# Patient Record
Sex: FEMALE | Race: ASIAN | NOT HISPANIC OR LATINO | URBAN - METROPOLITAN AREA
[De-identification: names, ages, dates, MRNs, and addresses within clinical notes are randomized per-mention and may not be internally consistent; named-entity substitution may affect disease eponyms.]

---

## 2017-10-05 ENCOUNTER — INPATIENT (INPATIENT)
Facility: HOSPITAL | Age: 32
LOS: 5 days | Discharge: HOME | End: 2017-10-11
Attending: PSYCHIATRY & NEUROLOGY | Admitting: PSYCHIATRY & NEUROLOGY
Payer: COMMERCIAL

## 2017-10-05 VITALS
OXYGEN SATURATION: 100 % | TEMPERATURE: 98 F | SYSTOLIC BLOOD PRESSURE: 125 MMHG | RESPIRATION RATE: 18 BRPM | HEART RATE: 90 BPM | DIASTOLIC BLOOD PRESSURE: 82 MMHG

## 2017-10-05 DIAGNOSIS — F32.2 MAJOR DEPRESSIVE DISORDER, SINGLE EPISODE, SEVERE WITHOUT PSYCHOTIC FEATURES: ICD-10-CM

## 2017-10-05 LAB
ALBUMIN SERPL ELPH-MCNC: 4.5 G/DL — SIGNIFICANT CHANGE UP (ref 3.3–5)
ALP SERPL-CCNC: 82 U/L — SIGNIFICANT CHANGE UP (ref 40–120)
ALT FLD-CCNC: 21 U/L — SIGNIFICANT CHANGE UP (ref 4–33)
AMPHET UR-MCNC: NEGATIVE — SIGNIFICANT CHANGE UP
APAP SERPL-MCNC: < 15 UG/ML — LOW (ref 15–25)
APPEARANCE UR: SIGNIFICANT CHANGE UP
AST SERPL-CCNC: 21 U/L — SIGNIFICANT CHANGE UP (ref 4–32)
BARBITURATES MEASUREMENT: NEGATIVE — SIGNIFICANT CHANGE UP
BARBITURATES UR SCN-MCNC: NEGATIVE — SIGNIFICANT CHANGE UP
BASOPHILS # BLD AUTO: 0.06 K/UL — SIGNIFICANT CHANGE UP (ref 0–0.2)
BASOPHILS NFR BLD AUTO: 0.7 % — SIGNIFICANT CHANGE UP (ref 0–2)
BENZODIAZ SERPL-MCNC: NEGATIVE — SIGNIFICANT CHANGE UP
BENZODIAZ UR-MCNC: NEGATIVE — SIGNIFICANT CHANGE UP
BILIRUB SERPL-MCNC: 0.4 MG/DL — SIGNIFICANT CHANGE UP (ref 0.2–1.2)
BILIRUB UR-MCNC: NEGATIVE — SIGNIFICANT CHANGE UP
BLOOD UR QL VISUAL: HIGH
BUN SERPL-MCNC: 14 MG/DL — SIGNIFICANT CHANGE UP (ref 7–23)
CALCIUM SERPL-MCNC: 9.3 MG/DL — SIGNIFICANT CHANGE UP (ref 8.4–10.5)
CANNABINOIDS UR-MCNC: NEGATIVE — SIGNIFICANT CHANGE UP
CHLORIDE SERPL-SCNC: 103 MMOL/L — SIGNIFICANT CHANGE UP (ref 98–107)
CO2 SERPL-SCNC: 25 MMOL/L — SIGNIFICANT CHANGE UP (ref 22–31)
COCAINE METAB.OTHER UR-MCNC: NEGATIVE — SIGNIFICANT CHANGE UP
COLOR SPEC: YELLOW — SIGNIFICANT CHANGE UP
CREAT SERPL-MCNC: 0.65 MG/DL — SIGNIFICANT CHANGE UP (ref 0.5–1.3)
EOSINOPHIL # BLD AUTO: 0.12 K/UL — SIGNIFICANT CHANGE UP (ref 0–0.5)
EOSINOPHIL NFR BLD AUTO: 1.3 % — SIGNIFICANT CHANGE UP (ref 0–6)
ETHANOL BLD-MCNC: < 10 MG/DL — SIGNIFICANT CHANGE UP
GLUCOSE SERPL-MCNC: 87 MG/DL — SIGNIFICANT CHANGE UP (ref 70–99)
GLUCOSE UR-MCNC: NEGATIVE — SIGNIFICANT CHANGE UP
HCG SERPL-ACNC: < 5 MIU/ML — SIGNIFICANT CHANGE UP
HCT VFR BLD CALC: 41.3 % — SIGNIFICANT CHANGE UP (ref 34.5–45)
HGB BLD-MCNC: 13.2 G/DL — SIGNIFICANT CHANGE UP (ref 11.5–15.5)
IMM GRANULOCYTES # BLD AUTO: 0.05 # — SIGNIFICANT CHANGE UP
IMM GRANULOCYTES NFR BLD AUTO: 0.6 % — SIGNIFICANT CHANGE UP (ref 0–1.5)
KETONES UR-MCNC: NEGATIVE — SIGNIFICANT CHANGE UP
LEUKOCYTE ESTERASE UR-ACNC: HIGH
LYMPHOCYTES # BLD AUTO: 2.36 K/UL — SIGNIFICANT CHANGE UP (ref 1–3.3)
LYMPHOCYTES # BLD AUTO: 26 % — SIGNIFICANT CHANGE UP (ref 13–44)
MCHC RBC-ENTMCNC: 27.3 PG — SIGNIFICANT CHANGE UP (ref 27–34)
MCHC RBC-ENTMCNC: 32 % — SIGNIFICANT CHANGE UP (ref 32–36)
MCV RBC AUTO: 85.5 FL — SIGNIFICANT CHANGE UP (ref 80–100)
METHADONE UR-MCNC: NEGATIVE — SIGNIFICANT CHANGE UP
MONOCYTES # BLD AUTO: 0.51 K/UL — SIGNIFICANT CHANGE UP (ref 0–0.9)
MONOCYTES NFR BLD AUTO: 5.6 % — SIGNIFICANT CHANGE UP (ref 2–14)
MUCOUS THREADS # UR AUTO: SIGNIFICANT CHANGE UP
NEUTROPHILS # BLD AUTO: 5.96 K/UL — SIGNIFICANT CHANGE UP (ref 1.8–7.4)
NEUTROPHILS NFR BLD AUTO: 65.8 % — SIGNIFICANT CHANGE UP (ref 43–77)
NITRITE UR-MCNC: NEGATIVE — SIGNIFICANT CHANGE UP
NRBC # FLD: 0 — SIGNIFICANT CHANGE UP
OPIATES UR-MCNC: NEGATIVE — SIGNIFICANT CHANGE UP
OXYCODONE UR-MCNC: NEGATIVE — SIGNIFICANT CHANGE UP
PCP UR-MCNC: NEGATIVE — SIGNIFICANT CHANGE UP
PH UR: 6.5 — SIGNIFICANT CHANGE UP (ref 4.6–8)
PLATELET # BLD AUTO: 270 K/UL — SIGNIFICANT CHANGE UP (ref 150–400)
PMV BLD: 10 FL — SIGNIFICANT CHANGE UP (ref 7–13)
POTASSIUM SERPL-MCNC: 4.1 MMOL/L — SIGNIFICANT CHANGE UP (ref 3.5–5.3)
POTASSIUM SERPL-SCNC: 4.1 MMOL/L — SIGNIFICANT CHANGE UP (ref 3.5–5.3)
PROT SERPL-MCNC: 7.3 G/DL — SIGNIFICANT CHANGE UP (ref 6–8.3)
PROT UR-MCNC: 100 — HIGH
RBC # BLD: 4.83 M/UL — SIGNIFICANT CHANGE UP (ref 3.8–5.2)
RBC # FLD: 13.2 % — SIGNIFICANT CHANGE UP (ref 10.3–14.5)
RBC CASTS # UR COMP ASSIST: SIGNIFICANT CHANGE UP (ref 0–?)
SALICYLATES SERPL-MCNC: < 5 MG/DL — LOW (ref 15–30)
SODIUM SERPL-SCNC: 142 MMOL/L — SIGNIFICANT CHANGE UP (ref 135–145)
SP GR SPEC: 1.02 — SIGNIFICANT CHANGE UP (ref 1–1.03)
SQUAMOUS # UR AUTO: SIGNIFICANT CHANGE UP
TSH SERPL-MCNC: 0.9 UIU/ML — SIGNIFICANT CHANGE UP (ref 0.27–4.2)
UROBILINOGEN FLD QL: NORMAL E.U. — SIGNIFICANT CHANGE UP (ref 0.1–0.2)
WBC # BLD: 9.06 K/UL — SIGNIFICANT CHANGE UP (ref 3.8–10.5)
WBC # FLD AUTO: 9.06 K/UL — SIGNIFICANT CHANGE UP (ref 3.8–10.5)
WBC UR QL: >50 — HIGH (ref 0–?)

## 2017-10-05 PROCEDURE — 99285 EMERGENCY DEPT VISIT HI MDM: CPT | Mod: GC

## 2017-10-05 RX ORDER — CEPHALEXIN 500 MG
500 CAPSULE ORAL ONCE
Qty: 0 | Refills: 0 | Status: COMPLETED | OUTPATIENT
Start: 2017-10-05 | End: 2017-10-05

## 2017-10-05 RX ORDER — NITROFURANTOIN MACROCRYSTAL 50 MG
100 CAPSULE ORAL
Qty: 0 | Refills: 0 | Status: COMPLETED | OUTPATIENT
Start: 2017-10-06 | End: 2017-10-10

## 2017-10-05 RX ORDER — ESCITALOPRAM OXALATE 10 MG/1
5 TABLET, FILM COATED ORAL DAILY
Qty: 0 | Refills: 0 | Status: DISCONTINUED | OUTPATIENT
Start: 2017-10-05 | End: 2017-10-06

## 2017-10-05 RX ORDER — DIPHENHYDRAMINE HCL 50 MG
50 CAPSULE ORAL AT BEDTIME
Qty: 0 | Refills: 0 | Status: DISCONTINUED | OUTPATIENT
Start: 2017-10-05 | End: 2017-10-11

## 2017-10-05 RX ADMIN — Medication 500 MILLIGRAM(S): at 20:17
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
## 2017-10-05 NOTE — ED BEHAVIORAL HEALTH ASSESSMENT NOTE - PSYCHIATRIC ISSUES AND PLAN (INCLUDE STANDING AND PRN MEDICATION)
Lexapro 5 mg daily for MDD, Benadryl 50 mg QHS prn insomnia; Ativan 2 mg IM Q6H PRN severe agitation

## 2017-10-05 NOTE — ED PROVIDER NOTE - PROGRESS NOTE DETAILS
Patient reports  foul odor with urine Spoke with Dr Palm from toxicology reviewed chart patient cleared fro [facility] no other medical concerns or complaints recommendations Spoke with Dr Palm from toxicology reviewed chart patient cleared sally CHOWDHURY no other medical concerns or complaints recommendations

## 2017-10-05 NOTE — ED PROVIDER NOTE - MEDICAL DECISION MAKING DETAILS
uti recommendation Keflex 500 mg BID x 7 days This is a 32 year old Female No PMHX BIBA from home for psych eval r/t agitation. patient reports she found out two months ago her  was cheating on her. Since then she has "been going crazy Patient too 15 tabs Unisom cleared by toxicology uti recommendation Keflex 500 mg BID x 7 days  Medical evaluation performed. There is no clinical evidence of intoxication or any acute medical problem requiring immediate intervention. Final disposition will be determined by psychiatrist.
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
## 2017-10-05 NOTE — ED ADULT TRIAGE NOTE - CHIEF COMPLAINT QUOTE
states " I am  having disputes with my  and feel helpless." patient took Unisom  15 tables yesterday morning  an induced vomiting  and unsure of vomiting pills. patient is tearful in triage.

## 2017-10-05 NOTE — ED BEHAVIORAL HEALTH ASSESSMENT NOTE - RISK ASSESSMENT
Patient is currently at high risk for suicide. Risk factors include current ambivalence about life, recent suicide attempt, further plans today, impulsivity, and major mood episode. Protective factors include her children, no access to guns, no history of psychiatric hospitalizations.

## 2017-10-05 NOTE — ED BEHAVIORAL HEALTH ASSESSMENT NOTE - CASE SUMMARY
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 Patient is ambivalent about her suicide attempt and impulsively threw an object through a window today. This along with her current mood episode of MDD place her at increased risk, requiring acute inpatient hospitalization for safety.  Patient represents an acute risk to self and admitted to [facility] inpatient psych unit.  No need for constant observation in a locked, supervised setting.  Transportation to unit arranged accompanied by security.
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
## 2017-10-05 NOTE — ED BEHAVIORAL HEALTH ASSESSMENT NOTE - DESCRIPTION
calm and cooperative none see HPI calm and cooperative  Vital Signs Last 24 Hrs  T(C): 36.9 (05 Oct 2017 14:44), Max: 36.9 (05 Oct 2017 14:44)  T(F): 98.4 (05 Oct 2017 14:44), Max: 98.4 (05 Oct 2017 14:44)  HR: 90 (05 Oct 2017 14:44) (90 - 90)  BP: 125/82 (05 Oct 2017 14:44) (125/82 - 125/82)  BP(mean): --  RR: 18 (05 Oct 2017 14:44) (18 - 18)  SpO2: 100% (05 Oct 2017 14:44) (100% - 100%)
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
## 2017-10-06 DIAGNOSIS — R69 ILLNESS, UNSPECIFIED: ICD-10-CM

## 2017-10-06 PROCEDURE — 90832 PSYTX W PT 30 MINUTES: CPT

## 2017-10-06 PROCEDURE — 99222 1ST HOSP IP/OBS MODERATE 55: CPT

## 2017-10-06 RX ORDER — SERTRALINE 25 MG/1
25 TABLET, FILM COATED ORAL DAILY
Qty: 0 | Refills: 0 | Status: DISCONTINUED | OUTPATIENT
Start: 2017-10-07 | End: 2017-10-09

## 2017-10-06 RX ADMIN — ESCITALOPRAM OXALATE 5 MILLIGRAM(S): 10 TABLET, FILM COATED ORAL at 08:15

## 2017-10-06 RX ADMIN — Medication 50 MILLIGRAM(S): at 20:58

## 2017-10-06 RX ADMIN — Medication 100 MILLIGRAM(S): at 15:46

## 2017-10-06 RX ADMIN — Medication 100 MILLIGRAM(S): at 08:15

## 2017-10-07 RX ADMIN — Medication 100 MILLIGRAM(S): at 08:46

## 2017-10-07 RX ADMIN — Medication 50 MILLIGRAM(S): at 22:15

## 2017-10-07 RX ADMIN — Medication 100 MILLIGRAM(S): at 15:59

## 2017-10-07 RX ADMIN — SERTRALINE 25 MILLIGRAM(S): 25 TABLET, FILM COATED ORAL at 08:46

## 2017-10-08 RX ADMIN — SERTRALINE 25 MILLIGRAM(S): 25 TABLET, FILM COATED ORAL at 08:07

## 2017-10-08 RX ADMIN — Medication 100 MILLIGRAM(S): at 08:07

## 2017-10-08 RX ADMIN — Medication 100 MILLIGRAM(S): at 16:07

## 2017-10-09 PROCEDURE — 99232 SBSQ HOSP IP/OBS MODERATE 35: CPT

## 2017-10-09 RX ORDER — SERTRALINE 25 MG/1
50 TABLET, FILM COATED ORAL DAILY
Qty: 0 | Refills: 0 | Status: DISCONTINUED | OUTPATIENT
Start: 2017-10-10 | End: 2017-10-11

## 2017-10-09 RX ADMIN — Medication 100 MILLIGRAM(S): at 18:22

## 2017-10-09 RX ADMIN — SERTRALINE 25 MILLIGRAM(S): 25 TABLET, FILM COATED ORAL at 10:17

## 2017-10-09 RX ADMIN — Medication 50 MILLIGRAM(S): at 21:44

## 2017-10-09 RX ADMIN — Medication 100 MILLIGRAM(S): at 10:17

## 2017-10-10 PROCEDURE — 90853 GROUP PSYCHOTHERAPY: CPT

## 2017-10-10 PROCEDURE — 99232 SBSQ HOSP IP/OBS MODERATE 35: CPT | Mod: 25

## 2017-10-10 RX ORDER — TRAZODONE HCL 50 MG
25 TABLET ORAL AT BEDTIME
Qty: 0 | Refills: 0 | Status: DISCONTINUED | OUTPATIENT
Start: 2017-10-10 | End: 2017-10-11

## 2017-10-10 RX ADMIN — Medication 25 MILLIGRAM(S): at 21:15

## 2017-10-10 RX ADMIN — Medication 100 MILLIGRAM(S): at 18:09

## 2017-10-10 RX ADMIN — SERTRALINE 50 MILLIGRAM(S): 25 TABLET, FILM COATED ORAL at 09:00

## 2017-10-10 RX ADMIN — Medication 100 MILLIGRAM(S): at 09:00

## 2017-10-11 VITALS — RESPIRATION RATE: 18 BRPM | TEMPERATURE: 98 F

## 2017-10-11 PROCEDURE — 99238 HOSP IP/OBS DSCHRG MGMT 30/<: CPT

## 2017-10-11 RX ORDER — TRAZODONE HCL 50 MG
50 TABLET ORAL AT BEDTIME
Qty: 0 | Refills: 0 | Status: DISCONTINUED | OUTPATIENT
Start: 2017-10-11 | End: 2017-10-11

## 2017-10-11 RX ORDER — TRAZODONE HCL 50 MG
1 TABLET ORAL
Qty: 15 | Refills: 0 | OUTPATIENT
Start: 2017-10-11 | End: 2017-10-26

## 2017-10-11 RX ORDER — SERTRALINE 25 MG/1
1 TABLET, FILM COATED ORAL
Qty: 15 | Refills: 0 | OUTPATIENT
Start: 2017-10-11 | End: 2017-10-26

## 2017-10-11 RX ADMIN — SERTRALINE 50 MILLIGRAM(S): 25 TABLET, FILM COATED ORAL at 09:37

## 2017-10-17 ENCOUNTER — APPOINTMENT (OUTPATIENT)
Dept: PSYCHIATRY | Facility: CLINIC | Age: 32
End: 2017-10-17
Payer: COMMERCIAL

## 2017-10-17 PROCEDURE — 99205 OFFICE O/P NEW HI 60 MIN: CPT

## 2017-10-27 ENCOUNTER — APPOINTMENT (OUTPATIENT)
Dept: PSYCHIATRY | Facility: CLINIC | Age: 32
End: 2017-10-27
Payer: COMMERCIAL

## 2017-10-27 PROCEDURE — 99214 OFFICE O/P EST MOD 30 MIN: CPT

## 2017-11-16 ENCOUNTER — APPOINTMENT (OUTPATIENT)
Dept: PSYCHIATRY | Facility: CLINIC | Age: 32
End: 2017-11-16
Payer: COMMERCIAL

## 2017-11-16 PROCEDURE — 99214 OFFICE O/P EST MOD 30 MIN: CPT

## 2017-11-16 RX ORDER — TRAZODONE HYDROCHLORIDE 50 MG/1
50 TABLET ORAL
Qty: 30 | Refills: 0 | Status: DISCONTINUED | COMMUNITY
Start: 2017-10-17 | End: 2017-11-16

## 2017-11-16 RX ORDER — TRAZODONE HYDROCHLORIDE 50 MG/1
50 TABLET ORAL
Refills: 0 | Status: DISCONTINUED | COMMUNITY
End: 2017-11-16

## 2018-10-04 ENCOUNTER — RESULT REVIEW (OUTPATIENT)
Age: 33
End: 2018-10-04

## 2019-02-05 ENCOUNTER — APPOINTMENT (OUTPATIENT)
Dept: PSYCHIATRY | Facility: CLINIC | Age: 34
End: 2019-02-05
Payer: COMMERCIAL

## 2019-02-05 PROCEDURE — 99213 OFFICE O/P EST LOW 20 MIN: CPT

## 2019-03-05 ENCOUNTER — APPOINTMENT (OUTPATIENT)
Dept: PSYCHIATRY | Facility: CLINIC | Age: 34
End: 2019-03-05
Payer: COMMERCIAL

## 2019-03-05 DIAGNOSIS — F41.9 ANXIETY DISORDER, UNSPECIFIED: ICD-10-CM

## 2019-03-05 DIAGNOSIS — F60.3 BORDERLINE PERSONALITY DISORDER: ICD-10-CM

## 2019-03-05 DIAGNOSIS — R45.86 EMOTIONAL LABILITY: ICD-10-CM

## 2019-03-05 PROCEDURE — 99214 OFFICE O/P EST MOD 30 MIN: CPT

## 2019-03-05 RX ORDER — RISPERIDONE 1 MG/1
1 TABLET, FILM COATED ORAL TWICE DAILY
Qty: 60 | Refills: 2 | Status: DISCONTINUED | COMMUNITY
Start: 2017-10-27 | End: 2019-03-05

## 2019-03-05 RX ORDER — SERTRALINE HYDROCHLORIDE 50 MG/1
50 TABLET, FILM COATED ORAL DAILY
Qty: 30 | Refills: 2 | Status: DISCONTINUED | COMMUNITY
Start: 2017-10-17 | End: 2019-03-05

## 2019-03-05 RX ORDER — DIVALPROEX SODIUM 500 1/1
500 TABLET, EXTENDED RELEASE ORAL DAILY
Qty: 60 | Refills: 2 | Status: DISCONTINUED | COMMUNITY
Start: 2017-10-17 | End: 2019-03-05

## 2021-06-23 ENCOUNTER — APPOINTMENT (OUTPATIENT)
Dept: ENDOCRINOLOGY | Facility: CLINIC | Age: 36
End: 2021-06-23
Payer: COMMERCIAL

## 2021-06-23 VITALS
OXYGEN SATURATION: 99 % | HEIGHT: 63 IN | SYSTOLIC BLOOD PRESSURE: 119 MMHG | DIASTOLIC BLOOD PRESSURE: 83 MMHG | WEIGHT: 200 LBS | HEART RATE: 78 BPM | BODY MASS INDEX: 35.44 KG/M2

## 2021-06-23 DIAGNOSIS — Z78.9 OTHER SPECIFIED HEALTH STATUS: ICD-10-CM

## 2021-06-23 DIAGNOSIS — Z83.3 FAMILY HISTORY OF DIABETES MELLITUS: ICD-10-CM

## 2021-06-23 PROCEDURE — 99204 OFFICE O/P NEW MOD 45 MIN: CPT | Mod: 25

## 2021-06-23 PROCEDURE — 36415 COLL VENOUS BLD VENIPUNCTURE: CPT

## 2021-06-23 PROCEDURE — 99072 ADDL SUPL MATRL&STAF TM PHE: CPT

## 2021-06-23 RX ORDER — ESCITALOPRAM OXALATE 10 MG/1
10 TABLET ORAL DAILY
Qty: 45 | Refills: 2 | Status: DISCONTINUED | COMMUNITY
Start: 2019-02-05 | End: 2021-06-23

## 2021-06-23 RX ORDER — SERTRALINE HYDROCHLORIDE 50 MG/1
50 TABLET, FILM COATED ORAL
Refills: 0 | Status: DISCONTINUED | COMMUNITY
End: 2021-06-23

## 2021-06-23 RX ORDER — GABAPENTIN 300 MG/1
300 CAPSULE ORAL 3 TIMES DAILY
Qty: 90 | Refills: 1 | Status: DISCONTINUED | COMMUNITY
Start: 2019-03-05 | End: 2021-06-23

## 2021-06-25 ENCOUNTER — LABORATORY RESULT (OUTPATIENT)
Age: 36
End: 2021-06-25

## 2021-06-28 LAB
CHOLEST SERPL-MCNC: 187 MG/DL
HDLC SERPL-MCNC: 54 MG/DL
LDLC SERPL CALC-MCNC: 108 MG/DL
NONHDLC SERPL-MCNC: 134 MG/DL
T4 FREE SERPL-MCNC: 1 NG/DL
THYROGLOB AB SERPL-ACNC: <20 IU/ML
THYROPEROXIDASE AB SERPL IA-ACNC: <10 IU/ML
TRIGL SERPL-MCNC: 127 MG/DL
TSH SERPL-ACNC: 1.47 UIU/ML
VIT B12 SERPL-MCNC: 603 PG/ML

## 2022-03-16 ENCOUNTER — APPOINTMENT (OUTPATIENT)
Dept: ENDOCRINOLOGY | Facility: CLINIC | Age: 37
End: 2022-03-16
Payer: COMMERCIAL

## 2022-03-16 VITALS — OXYGEN SATURATION: 98 % | HEART RATE: 79 BPM | DIASTOLIC BLOOD PRESSURE: 81 MMHG | SYSTOLIC BLOOD PRESSURE: 118 MMHG

## 2022-03-16 VITALS — WEIGHT: 202 LBS | HEIGHT: 63 IN | BODY MASS INDEX: 35.79 KG/M2

## 2022-03-16 DIAGNOSIS — R79.89 OTHER SPECIFIED ABNORMAL FINDINGS OF BLOOD CHEMISTRY: ICD-10-CM

## 2022-03-16 DIAGNOSIS — R63.5 ABNORMAL WEIGHT GAIN: ICD-10-CM

## 2022-03-16 PROCEDURE — 99214 OFFICE O/P EST MOD 30 MIN: CPT

## 2022-03-17 PROBLEM — R79.89 ELEVATED CORTISOL LEVEL: Status: ACTIVE | Noted: 2022-03-17

## 2022-04-08 ENCOUNTER — NON-APPOINTMENT (OUTPATIENT)
Age: 37
End: 2022-04-08

## 2022-04-08 LAB
CORTIS SAL-MCNC: ABNORMAL
CORTIS SERPL-MCNC: 0.5 UG/DL
ESTIMATED AVERAGE GLUCOSE: 117 MG/DL
HBA1C MFR BLD HPLC: 5.7 %

## 2022-08-22 ENCOUNTER — RESULT CHARGE (OUTPATIENT)
Age: 37
End: 2022-08-22

## 2022-08-22 ENCOUNTER — APPOINTMENT (OUTPATIENT)
Dept: ENDOCRINOLOGY | Facility: CLINIC | Age: 37
End: 2022-08-22

## 2022-08-22 VITALS — DIASTOLIC BLOOD PRESSURE: 81 MMHG | OXYGEN SATURATION: 95 % | HEART RATE: 85 BPM | SYSTOLIC BLOOD PRESSURE: 133 MMHG

## 2022-08-22 VITALS — BODY MASS INDEX: 37.92 KG/M2 | WEIGHT: 214 LBS | HEIGHT: 63 IN

## 2022-08-22 DIAGNOSIS — Z00.00 ENCOUNTER FOR GENERAL ADULT MEDICAL EXAMINATION W/OUT ABNORMAL FINDINGS: ICD-10-CM

## 2022-08-22 PROCEDURE — 83036 HEMOGLOBIN GLYCOSYLATED A1C: CPT

## 2022-08-22 PROCEDURE — 99214 OFFICE O/P EST MOD 30 MIN: CPT

## 2022-11-15 ENCOUNTER — RX RENEWAL (OUTPATIENT)
Age: 37
End: 2022-11-15

## 2022-12-23 ENCOUNTER — APPOINTMENT (OUTPATIENT)
Dept: ENDOCRINOLOGY | Facility: CLINIC | Age: 37
End: 2022-12-23
Payer: COMMERCIAL

## 2022-12-23 VITALS
SYSTOLIC BLOOD PRESSURE: 124 MMHG | BODY MASS INDEX: 34.91 KG/M2 | DIASTOLIC BLOOD PRESSURE: 82 MMHG | HEIGHT: 63 IN | OXYGEN SATURATION: 98 % | WEIGHT: 197 LBS | HEART RATE: 70 BPM

## 2022-12-23 PROCEDURE — 99214 OFFICE O/P EST MOD 30 MIN: CPT | Mod: 25

## 2022-12-23 PROCEDURE — 83036 HEMOGLOBIN GLYCOSYLATED A1C: CPT | Mod: QW

## 2022-12-23 RX ORDER — LIRAGLUTIDE 6 MG/ML
18 INJECTION, SOLUTION SUBCUTANEOUS
Qty: 1 | Refills: 2 | Status: DISCONTINUED | COMMUNITY
Start: 2022-08-25 | End: 2022-12-23

## 2023-03-06 ENCOUNTER — RX RENEWAL (OUTPATIENT)
Age: 38
End: 2023-03-06

## 2023-05-19 ENCOUNTER — RX RENEWAL (OUTPATIENT)
Age: 38
End: 2023-05-19

## 2023-05-19 NOTE — ED BEHAVIORAL HEALTH ASSESSMENT NOTE - NS ED BHA ED COURSE UTILIZATION OF 1 TO 1 IN ED YN
Physical Therapy Visit    PT DAILY NOTE FOR OUTPATIENT THERAPY    Patient: Mini Hodges MRN: [id number]  : 1999 23 y.o.  Referring Physician: System, Provider Not In  Date of Visit: 2023    Certification Dates: 23 through 23    Diagnosis:   1. Pelvic floor dysfunction        Chief Complaints:       Precautions:   Existing Precautions/Restrictions: no known precautions/restrictions      TODAY'S VISIT    Time In Session:  Start Time: 908 (pt arrives late)  Stop Time: 957  Time Calculation (min): 49 min   History/Vitals/Pain/Encounter Info - 23 0910        Injury History/Precautions/Daily Required Info    Document Type daily treatment     Primary Therapist Andree     Referring Physician ABENA Talbert     Existing Precautions/Restrictions no known precautions/restrictions     History of present illness/functional impairment Mini is a 24 y/o cis gender female referred to PFPT by obgyn with c/o dyspareunia and pain with pelvic exams.     Patient/Family/Caregiver Comments/Observations Mini reports that her cramps went away following her last session. She presents with her dilators today     Patient reported fall since last visit No        Pain Assessment    Currently in pain No/Denies        Pre Activity Vital Signs    /68     BP Location Left upper arm     BP Method Manual     Patient Position Sitting                Daily Treatment Assessment and Plan - 23        Daily Treatment Assessment and Plan    Progress toward goals Progressing     Daily Outcome Summary Continued to work on internal release today, pt reports discomfort with insertion and removal, TTP in bilat BC, STP and DTP. Mini presents with dilator set, completed in session dilator training with handout provided. She demonstrates and verbalizes understanding for dilator use at home. Tolerates full insertion of level 2 dilator with no more than 2/10 pain reported. She will benefit from continued  skilled PT to reduce pain and improve function of Pfm     Plan and Recommendations internal, core strengthening                     OBJECTIVE DATA TAKEN TODAY:    None taken    Today's Treatment:    Mini is a 22 y/o cis gender female presenting to PFPT with c/o dyspareunia and pain with pelvic exams.  Mini went to the gynecologist about a few month ago, she has been having pain with intercourse and was referred to PFPT. She notes always having pain with insertion.     Bladder: Comment:        Urination frequency 6-7x a day   5/11: feeling the same    Urgency:  Can hold if she needs to, will sometimes have cramping since her IUD   Incontinence Denies    Pads denies   Nocturia denies   Pain 1x a night    Emptying Does not need to strain, feels like she fully   Prolapse symptoms  Denies    Liquid consumption Drinks about 3-4 refills of 24 oz water bottle, drinks about 3 cups of coffee, pre workout if she is going to the gym   UTI history When she was first sexually active she had 3 UTIs in a month, has not had a UTI recently          Bowel: Comment:        Frequency 2-3x a day, typically spread throughout the day, does not need to strain  5/11: currently on her period so frequency is not her normal    Urge Strong normal urge, can make it    Incontinence denies   Emptying Feels fully empty when she finishes   [place] stool Type 3-4   Fiber Will take fiber, green powder, drink more water and then will take laxatives. Will start taking supplements if she misses a day    Does meal prep, oatmeal or breakfast sandwich or eggs/veggies, potatoes or rice with chicken or turkey for lunch, dinner protein starch and veggie, does a lot protein, will snack on rice cakes and fruit or protein bars and yogurt   Management Fiber, green powder, water and laxatives  5/11: she reports not needing laxatives recently, but has been feeling more bloated on her period    Pain Had a hemorrhoid once about a few years ago, denies pain with  "BMs         OBGYN Comment:        Pregnancies 0   Births 0   Birth type n/a   Tears/ episiotomies n/a   Surgery No surgeries   Menstruation IUD has been lighter flow, consistent and regular, lots of cramping but has been less since her IUD. Prior heavier flow, was getting a lot of cramping     5/11: currently on her period, flow is heavier this month                    Sexual Activity Comment:        Type Penis vaginal    Pain Initial insertion will be pain, lasts about 2 minutes, feels it reduces after \"muscle relax,\" will have burning/tearing sensation     Does not notice a difference in pain based on position    Tried lubrication once or twice, will have burning but unsure if it was from initial insertion or the lubrication     History of bleeding post intercourse     Will have discomfort for about 5 min    Always has had pain with intercourse     At worst: 7/10   Post intercourse discomfort: 3-4/10     5/11: Less pain with certain positions, but pain is still present. [place] continues to be most painful period. Pain averages 6/10. Less discomfort following intercourse    Orgasm Yes    Masturbation Yes, denies pain   Libido Feels it is nonexistence, feels that it could be related to her pain with intercourse    Pain with speculum insertion          Pain Comment:        Low back pain Went for PT previously, has not been noticing back pain since                                Other Comment:        Physical activity  Go to the gym, will do lifting and cardio, walks outside/hiking, 3x a week typically   PLOF Previous history of dancing, did ballet and modern in college   Living environment Lives with her boyfriend, apartment    Other Works in coffee shop part time, part time working in the office for [employer]    Stress management Going to the gym, spending time with partner and watching TV             Systems Review:   Cord questions:  Pins and needles or tingling in both arms and both legs at the same time? " denies  Problems with stumbling or falling? Denies     Cauda equina questions:  Problems with bowel and bladder control? Specifically retention? See above  Pins and needles or numbness in the saddle area? Denies     Review of systems:  General - (chills, night sweats, recent infection, fever, weight loss/gain, unexplained night pain, excessive fatigue): Denies  Do you have a history of cancer? Denies  Gastrointestinal system - (abdominal pain, bowel changes, nausea, vomiting, bloating): Bloating that is inconsistent, might have it prior to her menstrual period   Cardiovascular system - (chest pain, palpitations, orthopnea, other): Denies  Respiratory system - (cough, SOB, sputum production, other): Denies  Musculoskeletal system: osteoporosis, vertebral fracture? Denies  Endocrine - (polyuria, polydipsia, heat or cold intolerance, other): Denies  Neurological - (numbness/tingling, falling/stumbling, HA, dizziness, diplopia, dysphagia/dysarthria, double vision, tinnitus, memory, drop attacks, other): Denies  Any long-term steroid use? Denies      Patient goals stop having pain with sexual activity     OBJECTIVE FINDINGS:      ASSESSMENT   PELVIS/POSTURE            LUMBAR AROM    Flexion    Extension    rotation    Sidebending        HIP ROM    ER    IR    Flexion    Extension    Ankle ROM    Lower Extremity Strength    hip flexion    hip extension    Hip IR    Hip ER    Hip abduction    Hip adduction    Knee    Ankle            SPECIAL TESTS    Hamstring length    Hip FADIR    Hip SCOUR    Trendelenburg    Slump test    SLR test    BAM            LUMBAR PALPATION    HIP PALPATION    OTHER PALPATION        SI JOINT TESTING     S/L Compression test    Thigh thrust test    Prone sacral thrust test    Gaenslen test        ABDOMEN    palpation Muscle guarding present and TTP reported with diaphragm palpation and lower abdomen on L>R side   Lower rib angle 90 degrees   Breathing pattern Min to moderate diaphragm  engagement with breathing   Diastasis    Skin/ integumentary        BALANCE    GAIT MECHANICS    RUNNING MECHANICS    SQUATTING MECHANICS    LUNGING MECHANICS    BED MOBILITY    SIT TO STAND      Verbal consent give for internal evaluation and treatment. Pt provides verbal consent for internal assessment     ASSESSMENT   EXTERNAL genital EXAM    PFM OBSERVATION    Skin integrity    contraction    relaxation    Bulge/pelvic drop    Cough    Light touch sensation    INTERNAL PFM exam Internal vaginal   Levator Ani Strength  4/5   Power    Endurance    Repetitions    Quick Flicks    Coordination Demonstrates appropriate lengthening and contraction        Palpation TTP with insertion, muscle guarding and TTP in bilat BC, STP and IC on L>R. Muscle guarding present in bilat BC, STP, and IC     5/11: less discomfort reported with initial insertion, muscle guarding present in bilat BC, STP and IC           TAILBONE                    TREATMENT LOG:  verbal consent for internal intervention: Pt provides verbal consent for internal assessment    Diagnosis  Pelvic Floor Dysfunction    Precautions       PT INITIAL EVALUATION:   4/6/23     PT PROGRESS NOTE:   5/11/23     Y/N Treatment Details: Time:   MODALITIES  89435   0 mins   Heat       Ice       THER ACT          51723   25 mins   OUTCOME MEASURES  PSFS: 40%    Falls Screen, Medication Review, VS, pain assessment Y      Pt Education:verbalized understanding of education and returned demonstration appropriately  Y Pelvic floor anatomy/ purpose function, POC, breathing pattern, abdominal tension influence on PFm, pain cycle of vagina   4/13: reviewed pain cycle of vagina, discussed findings on internal assessment, pelvic stability, intimate mabel dilators  5/4: meditation/mindfullness for nervous system down regulation   5/11: dilator options  5/18: dilator training, frequency of dilation, handout provided with setup/cleanup/progression for dilators    HEP / HEP Review Y   eval: quadruped diaphragmatic breathing, incorporating breathing throughout day   4/13: continue breathing, dilators   5/4: child's pose, happy baby, guided relaxation  5/11: pallof press, shoulder extension, flamingo  5/18: dilators    THER EX         59620   0 mins   pallof press n OTB; x20    Shoulder extension n BTB; x20    Flamingo n BTB; x20                 NEURO RE-ED    55066   0 min    Diaphragmatic breathing n In quadruped, x10                             Guided meditation  n Etelvina Conley Full Body Relaxation     MANUAL                 98866   24 mins   Joint Mobilization       Soft Tissue mobilizations External      Soft Tissue Mobilization Internal y Release to bilat STP, BC and IC    IASTM                                          No

## 2023-05-24 ENCOUNTER — APPOINTMENT (OUTPATIENT)
Dept: ENDOCRINOLOGY | Facility: CLINIC | Age: 38
End: 2023-05-24
Payer: COMMERCIAL

## 2023-05-24 VITALS
RESPIRATION RATE: 16 BRPM | OXYGEN SATURATION: 97 % | DIASTOLIC BLOOD PRESSURE: 76 MMHG | HEIGHT: 64 IN | BODY MASS INDEX: 32.27 KG/M2 | HEART RATE: 78 BPM | WEIGHT: 189 LBS | SYSTOLIC BLOOD PRESSURE: 109 MMHG

## 2023-05-24 PROCEDURE — 99214 OFFICE O/P EST MOD 30 MIN: CPT

## 2023-06-12 ENCOUNTER — RX RENEWAL (OUTPATIENT)
Age: 38
End: 2023-06-12

## 2023-06-15 ENCOUNTER — TRANSCRIPTION ENCOUNTER (OUTPATIENT)
Age: 38
End: 2023-06-15

## 2023-06-21 ENCOUNTER — TRANSCRIPTION ENCOUNTER (OUTPATIENT)
Age: 38
End: 2023-06-21

## 2023-06-23 ENCOUNTER — TRANSCRIPTION ENCOUNTER (OUTPATIENT)
Age: 38
End: 2023-06-23

## 2023-07-05 NOTE — ED ADULT NURSE NOTE - CAS DISCH BELONGINGS RETURNED
"  Assessment & Plan     Blood pressure is stable and no concerns    Radial nerve compression, right  Placed referral to therapy  - Occupational Therapy Referral; Future           MED REC REQUIRED  Post Medication Reconciliation Status: discharge medications reconciled, continue medications without change  BMI:   Estimated body mass index is 31.02 kg/m  as calculated from the following:    Height as of 6/22/23: 1.727 m (5' 8\").    Weight as of this encounter: 92.5 kg (204 lb).           Vi Baldwin MD  [facility] [place] is a 39 year old, presenting for the following health issues:  No chief complaint on file.        7/5/2023     1:53 PM   Additional Questions   Roomed by Marly CHAVEZ MA   Accompanied by self         7/5/2023     1:53 PM   Patient Reported Additional Medications   Patient reports taking the following new medications none     HPI       Hospital Follow-up Visit:    Hospital/Nursing Home/IP Rehab Facility: [facility]  Date of Admission: 06/24/2023  Date of Discharge: 06/29/2023  Reason(s) for Admission: severe range blood pressure consistent with postpartum pre-eclampsia    Was your hospitalization related to COVID-19? No   Problems taking medications regularly:  None  Medication changes since discharge: None  Problems adhering to non-medication therapy:  None    Summary of hospitalization:  [facility] discharge summary reviewed  Has noted some LLE but not too much  Much better since delivery  BP Readings from Last 6 Encounters:   07/28/23 128/84   07/05/23 122/84   07/03/23 97/73   06/29/23 117/79   06/22/23 111/75   06/18/23 (!) 148/97     [place] medications well    Has noted numbness in arm  Difficulty gripping things  Wonders wbout therapy  Diagnostic Tests/Treatments reviewed.  Follow up needed: none  Other Healthcare Providers Involved in Patient s Care:         None  Update since discharge: improved.     mood " is good:  At the end of pregnancyt she had been admitted for emerging pre-eclampsia    PATIENT HEALTH QUESTIONNAIRE-9 (PHQ - 9)    Over the last 2 weeks, how often have you been bothered by any of the following problems?    1. Little interest or pleasure in doing things -  Not at all   2. Feeling down, depressed, or hopeless -  Not at all   3. Trouble falling or staying asleep, or sleeping too much - Not at all   4. Feeling tired or having little energy -  Not at all   5. Poor appetite or overeating -  Not at all   6. Feeling bad about yourself - or that you are a failure or have let yourself or your family down -  Not at all   7. Trouble concentrating on things, such as reading the newspaper or watching television - Not at all   8. Moving or speaking so slowly that other people could have noticed? Or the opposite - being so fidgety or restless that you have been moving around a lot more than usual Not at all   9. Thoughts that you would be better off dead or of hurting  yourself in some way Not at all   Total Score: 0     If you checked off any problems, how difficult have these problems made it for you to do your work, take care of things at home, or get along with other people?      Developed by Jcarlos Arriaza, Montse Diaz, Albino Larose and colleagues, with an educational liban from [employer] No permission required to reproduce, translate, display or distribute. permission required to reproduce, translate, display or distribute.  Has less anxiety - still thoughts of  Anxiety    Developed enlarged nodules under both breasts  Was given ANX left went down a lot right did not  Started lactating  Had an ultrasound right axilla    Right lower arm tendomitis      Plan of care communicated with patient                   Review of Systems         Objective    /84   Pulse 76   Temp 97.9  F (36.6  C) (Temporal)   Resp 16   Wt 92.5 kg (204 lb)   LMP 09/23/2022   SpO2 99%   BMI 31.02 kg/m     Body mass index is 31.02 kg/m .  Physical Exam   GENERAL: healthy, alert and no distress  HENT: ear canals and TM's normal, nose and mouth without ulcers or lesions  NECK: no adenopathy, no asymmetry, masses, or scars and thyroid normal to palpation  RESP: lungs clear to auscultation - no rales, rhonchi or wheezes  CV: regular rate and rhythm, normal S1 S2, no S3 or S4, no murmur, click or rub, no peripheral edema and peripheral pulses strong    Admission on 06/24/2023, Discharged on 06/29/2023   Component Date Value Ref Range Status    Sodium 06/24/2023 139  136 - 145 mmol/L Final    Potassium 06/24/2023 3.4  3.4 - 5.3 mmol/L Final    Chloride 06/24/2023 106  98 - 107 mmol/L Final    Carbon Dioxide (CO2) 06/24/2023 21 (L)  22 - 29 mmol/L Final    Anion Gap 06/24/2023 12  7 - 15 mmol/L Final    Urea Nitrogen 06/24/2023 9.6  6.0 - 20.0 mg/dL Final    Creatinine 06/24/2023 0.85  0.51 - 0.95 mg/dL Final    Calcium 06/24/2023 8.4 (L)  8.6 - 10.0 mg/dL Final    Glucose 06/24/2023 125 (H)  70 - 99 mg/dL Final    Alkaline Phosphatase 06/24/2023 102  35 - 104 U/L Final    AST 06/24/2023 30  0 - 45 U/L Final    Reference intervals for this test were updated on 6/12/2023 to more accurately reflect our healthy population. There may be differences in the flagging of prior results with similar values performed with this method. Interpretation of those prior results can be made in the context of the updated reference intervals.    ALT 06/24/2023 26  0 - 50 U/L Final    Reference intervals for this test were updated on 6/12/2023 to more accurately reflect our healthy population. There may be differences in the flagging of prior results with similar values performed with this method. Interpretation of those prior results can be made in the context of the updated reference intervals.      Protein Total 06/24/2023 6.6  6.4 - 8.3 g/dL Final    Albumin 06/24/2023 3.3 (L)  3.5 - 5.2 g/dL Final    Bilirubin Total 06/24/2023 0.4  <=1.2  mg/dL Final    GFR Estimate 06/24/2023 89  >60 mL/min/1.73m2 Final    Ventricular Rate 06/24/2023 71  BPM Final    Atrial Rate 06/24/2023 71  BPM Final    VT Interval 06/24/2023 162  ms Final    QRS Duration 06/24/2023 82  ms Final    QT 06/24/2023 422  ms Final    QTc 06/24/2023 458  ms Final    P Axis 06/24/2023 53  degrees Final    R AXIS 06/24/2023 14  degrees Final    T Axis 06/24/2023 49  degrees Final    Interpretation ECG 06/24/2023    Final                    Value:Sinus rhythm  Possible Left atrial enlargement  Septal infarct , age undetermined  Abnormal ECG  When compared with ECG of 17-MAR-2023 22:28,  Septal infarct is now Present  Confirmed by GENERATED REPORT, COMPUTER ([id number]),  Zoey Sarkar ([id number]) on 6/24/2023 7:04:30 PM      Troponin T, High Sensitivity 06/24/2023 <6  <=14 ng/L Final    Either a High Sensitivity Troponin T baseline (0 hours) value = 100 ng/L, or an increase in High Sensitivity Troponin T = 7 ng/L at 2 hours compared to 0 hours (2-0 hours), suggests myocardial injury, and urgent clinical attention is required.    If the 2-0 hours increase is <7 ng/L, a High Sensitivity Troponin T result above gender-specific reference ranges warrants further evaluation.   Recommendations for further evaluation include correlation with clinical decision-making tool (e.g., HEART), a 3rd High Sensitivity Troponin T test 2 hours after the 2nd (a 20% change from baseline would represent concern), admission for observation, close PCC/cardiology follow-up, or urgent outpatient provocative testing.    WBC Count 06/24/2023 7.7  4.0 - 11.0 10e3/uL Final    RBC Count 06/24/2023 3.74 (L)  3.80 - 5.20 10e6/uL Final    Hemoglobin 06/24/2023 11.6 (L)  11.7 - 15.7 g/dL Final    Hematocrit 06/24/2023 34.5 (L)  35.0 - 47.0 % Final    MCV 06/24/2023 92  78 - 100 fL Final    MCH 06/24/2023 31.0  26.5 - 33.0 pg Final    MCHC 06/24/2023 33.6  31.5 - 36.5 g/dL Final    RDW 06/24/2023 13.2  10.0 - 15.0 % Final     Platelet Count 06/24/2023 369  150 - 450 10e3/uL Final    % Neutrophils 06/24/2023 60  % Final    % Lymphocytes 06/24/2023 27  % Final    % Monocytes 06/24/2023 7  % Final    % Eosinophils 06/24/2023 5  % Final    % Basophils 06/24/2023 1  % Final    % Immature Granulocytes 06/24/2023 0  % Final    NRBCs per 100 WBC 06/24/2023 0  <1 /100 Final    Absolute Neutrophils 06/24/2023 4.6  1.6 - 8.3 10e3/uL Final    Absolute Lymphocytes 06/24/2023 2.1  0.8 - 5.3 10e3/uL Final    Absolute Monocytes 06/24/2023 0.6  0.0 - 1.3 10e3/uL Final    Absolute Eosinophils 06/24/2023 0.4  0.0 - 0.7 10e3/uL Final    Absolute Basophils 06/24/2023 0.0  0.0 - 0.2 10e3/uL Final    Absolute Immature Granulocytes 06/24/2023 0.0  <=0.4 10e3/uL Final    Absolute NRBCs 06/24/2023 0.0  10e3/uL Final    Hold Specimen 06/24/2023 JI   Final    N terminal Pro BNP Inpatient 06/24/2023 80  0 - 450 pg/mL Final    Reference range shown and results flagged as abnormal are suggested inpatient cut points for confirming diagnosis if CHF in an acute setting. Establishing a baseline value for each individual patient is useful for follow-up. An inpatient or emergency department NT-proPBNP <300 pg/mL effectively rules out acute CHF, with 99% negative predictive value.    The outpatient non-acute reference range for ruling out CHF is:  0-125 pg/mL (age 18 to less than 75)  0-450 pg/mL (age 75 yrs and older)     Lipase 06/24/2023 17  13 - 60 U/L Final    Protein Total 06/24/2023 6.7  6.4 - 8.3 g/dL Final    Albumin 06/24/2023 3.4 (L)  3.5 - 5.2 g/dL Final    Bilirubin Total 06/24/2023 0.4  <=1.2 mg/dL Final    Alkaline Phosphatase 06/24/2023 100  35 - 104 U/L Final    AST 06/24/2023 32  0 - 45 U/L Final    Reference intervals for this test were updated on 6/12/2023 to more accurately reflect our healthy population. There may be differences in the flagging of prior results with similar values performed with this method. Interpretation of those prior results can be  made in the context of the updated reference intervals.    ALT 06/24/2023 27  0 - 50 U/L Final    Reference intervals for this test were updated on 6/12/2023 to more accurately reflect our healthy population. There may be differences in the flagging of prior results with similar values performed with this method. Interpretation of those prior results can be made in the context of the updated reference intervals.      Bilirubin Direct 06/24/2023 <0.20  0.00 - 0.30 mg/dL Final    Sodium 06/24/2023 138  136 - 145 mmol/L Final    Potassium 06/24/2023 3.2 (L)  3.4 - 5.3 mmol/L Final    Chloride 06/24/2023 102  98 - 107 mmol/L Final    Carbon Dioxide (CO2) 06/24/2023 23  22 - 29 mmol/L Final    Anion Gap 06/24/2023 13  7 - 15 mmol/L Final    Urea Nitrogen 06/24/2023 9.2  6.0 - 20.0 mg/dL Final    Creatinine 06/24/2023 0.82  0.51 - 0.95 mg/dL Final    Calcium 06/24/2023 8.5 (L)  8.6 - 10.0 mg/dL Final    Glucose 06/24/2023 93  70 - 99 mg/dL Final    Alkaline Phosphatase 06/24/2023 104  35 - 104 U/L Final    AST 06/24/2023 27  0 - 45 U/L Final    Reference intervals for this test were updated on 6/12/2023 to more accurately reflect our healthy population. There may be differences in the flagging of prior results with similar values performed with this method. Interpretation of those prior results can be made in the context of the updated reference intervals.    ALT 06/24/2023 26  0 - 50 U/L Final    Reference intervals for this test were updated on 6/12/2023 to more accurately reflect our healthy population. There may be differences in the flagging of prior results with similar values performed with this method. Interpretation of those prior results can be made in the context of the updated reference intervals.      Protein Total 06/24/2023 6.9  6.4 - 8.3 g/dL Final    Albumin 06/24/2023 3.5  3.5 - 5.2 g/dL Final    Bilirubin Total 06/24/2023 0.4  <=1.2 mg/dL Final    GFR Estimate 06/24/2023 >90  >60 mL/min/1.73m2 Final     WBC Count 06/24/2023 7.1  4.0 - 11.0 10e3/uL Final    RBC Count 06/24/2023 3.90  3.80 - 5.20 10e6/uL Final    Hemoglobin 06/24/2023 12.2  11.7 - 15.7 g/dL Final    Hematocrit 06/24/2023 36.8  35.0 - 47.0 % Final    MCV 06/24/2023 94  78 - 100 fL Final    MCH 06/24/2023 31.3  26.5 - 33.0 pg Final    MCHC 06/24/2023 33.2  31.5 - 36.5 g/dL Final    RDW 06/24/2023 13.2  10.0 - 15.0 % Final    Platelet Count 06/24/2023 378  150 - 450 10e3/uL Final    ALT 06/25/2023 24  0 - 50 U/L Final    Reference intervals for this test were updated on 6/12/2023 to more accurately reflect our healthy population. There may be differences in the flagging of prior results with similar values performed with this method. Interpretation of those prior results can be made in the context of the updated reference intervals.      AST 06/25/2023 24  0 - 45 U/L Final    Reference intervals for this test were updated on 6/12/2023 to more accurately reflect our healthy population. There may be differences in the flagging of prior results with similar values performed with this method. Interpretation of those prior results can be made in the context of the updated reference intervals.    Creatinine 06/25/2023 0.85  0.51 - 0.95 mg/dL Final    GFR Estimate 06/25/2023 89  >60 mL/min/1.73m2 Final    Hemoglobin 06/25/2023 11.9  11.7 - 15.7 g/dL Final    Platelet Count 06/25/2023 361  150 - 450 10e3/uL Final    ALT 06/26/2023 24  0 - 50 U/L Final    Reference intervals for this test were updated on 6/12/2023 to more accurately reflect our healthy population. There may be differences in the flagging of prior results with similar values performed with this method. Interpretation of those prior results can be made in the context of the updated reference intervals.      AST 06/26/2023 24  0 - 45 U/L Final    Reference intervals for this test were updated on 6/12/2023 to more accurately reflect our healthy population. There may be differences in the flagging of  prior results with similar values performed with this method. Interpretation of those prior results can be made in the context of the updated reference intervals.    Creatinine 06/26/2023 0.85  0.51 - 0.95 mg/dL Final    GFR Estimate 06/26/2023 89  >60 mL/min/1.73m2 Final    Hemoglobin 06/26/2023 12.1  11.7 - 15.7 g/dL Final    Platelet Count 06/26/2023 388  150 - 450 10e3/uL Final    ALT 06/27/2023 21  0 - 50 U/L Final    Reference intervals for this test were updated on 6/12/2023 to more accurately reflect our healthy population. There may be differences in the flagging of prior results with similar values performed with this method. Interpretation of those prior results can be made in the context of the updated reference intervals.      AST 06/27/2023 20  0 - 45 U/L Final    Reference intervals for this test were updated on 6/12/2023 to more accurately reflect our healthy population. There may be differences in the flagging of prior results with similar values performed with this method. Interpretation of those prior results can be made in the context of the updated reference intervals.    Creatinine 06/27/2023 0.75  0.51 - 0.95 mg/dL Final    GFR Estimate 06/27/2023 >90  >60 mL/min/1.73m2 Final    Hemoglobin 06/27/2023 11.5 (L)  11.7 - 15.7 g/dL Final    Platelet Count 06/27/2023 362  150 - 450 10e3/uL Final                   Answers for HPI/ROS submitted by the patient on 7/5/2023  If you checked off any problems, how difficult have these problems made it for you to do your work, take care of things at home, or get along with other people?: Not difficult at all  PHQ9 TOTAL SCORE: 0       Yes

## 2023-08-22 ENCOUNTER — TRANSCRIPTION ENCOUNTER (OUTPATIENT)
Age: 38
End: 2023-08-22

## 2023-09-27 ENCOUNTER — APPOINTMENT (OUTPATIENT)
Dept: ENDOCRINOLOGY | Facility: CLINIC | Age: 38
End: 2023-09-27
Payer: COMMERCIAL

## 2023-09-27 VITALS — BODY MASS INDEX: 31.41 KG/M2 | WEIGHT: 184 LBS | HEART RATE: 86 BPM | OXYGEN SATURATION: 99 % | HEIGHT: 64 IN

## 2023-09-27 VITALS — SYSTOLIC BLOOD PRESSURE: 108 MMHG | DIASTOLIC BLOOD PRESSURE: 76 MMHG

## 2023-09-27 PROCEDURE — 99203 OFFICE O/P NEW LOW 30 MIN: CPT

## 2023-09-27 PROCEDURE — 99213 OFFICE O/P EST LOW 20 MIN: CPT

## 2023-09-27 RX ORDER — SEMAGLUTIDE 1.34 MG/ML
2 INJECTION, SOLUTION SUBCUTANEOUS
Qty: 1.5 | Refills: 1 | Status: DISCONTINUED | COMMUNITY
Start: 2022-08-22 | End: 2023-09-27

## 2023-11-13 ENCOUNTER — RX RENEWAL (OUTPATIENT)
Age: 38
End: 2023-11-13

## 2024-01-24 ENCOUNTER — APPOINTMENT (OUTPATIENT)
Dept: ENDOCRINOLOGY | Facility: CLINIC | Age: 39
End: 2024-01-24
Payer: COMMERCIAL

## 2024-01-24 DIAGNOSIS — E66.9 OBESITY, UNSPECIFIED: ICD-10-CM

## 2024-01-24 DIAGNOSIS — R73.03 PREDIABETES.: ICD-10-CM

## 2024-01-24 PROCEDURE — 99213 OFFICE O/P EST LOW 20 MIN: CPT | Mod: 95

## 2024-01-24 RX ORDER — SEMAGLUTIDE 1.34 MG/ML
4 INJECTION, SOLUTION SUBCUTANEOUS
Qty: 12 | Refills: 2 | Status: ACTIVE | COMMUNITY
Start: 2023-05-19 | End: 1900-01-01

## 2024-01-24 RX ORDER — DEXAMETHASONE 1 MG/1
1 TABLET ORAL
Qty: 1 | Refills: 0 | Status: DISCONTINUED | COMMUNITY
Start: 2021-07-12 | End: 2024-01-24

## 2024-01-24 RX ORDER — METFORMIN ER 500 MG 500 MG/1
500 TABLET ORAL
Qty: 60 | Refills: 2 | Status: DISCONTINUED | COMMUNITY
Start: 2022-08-31 | End: 2024-01-24

## 2024-01-24 NOTE — HISTORY OF PRESENT ILLNESS
[FreeTextEntry1] : Ms. Moore is presenting for follow up of weight gain.   38 year old female with PMH of prediabetes, BMI 35 presenting for weight management.   #BMI 35 --> 31  Pt had weight gain of 50 pounds within 1 year. Pt occasional power walks but has recently stopped. Pt is a pescatarian (eats fish). Skips breakfast, eats grains like quinoa and rice with salads or rice,crackers. No soda or juice. Denies purple stretch marks. Notes she has some darkening of the right shin. She is fatigued frequently. Has children. Periods have been occurring every month except for the last 2 months where she had spotting in between periods (felt it was 2 separate periods each month). Denies constipation, temperature changes, hair loss, difficulty swallowing.   Interval hx:  Pt has healthy diet, walks a few times per week for 45 minutes.  Now weighs 172lbs <-- 181lbs from 197 from 215.  Tolerating ozempic 1.0mg weekly (started Nov 2023). Mild constipation. Pt lost her bother.  A1c 5.4% Dec 2023  Pt moved to [facility].

## 2024-01-24 NOTE — REASON FOR VISIT
[Home] : at home, [unfilled] , at the time of the visit. [Medical Office: ([facility])___] : at the medical office located in  [Patient] : the patient [Weight Management/Obesity] : weight management/obesity [Follow - Up] : a follow-up visit

## 2024-01-24 NOTE — REVIEW OF SYSTEMS
[Fatigue] : fatigue [As Noted in HPI] : as noted in HPI [Recent Weight Gain (___ Lbs)] : recent weight gain: [unfilled] lbs [Negative] : Heme/Lymph

## 2024-07-23 ENCOUNTER — TRANSCRIPTION ENCOUNTER (OUTPATIENT)
Age: 39
End: 2024-07-23

## 2024-09-10 ENCOUNTER — APPOINTMENT (OUTPATIENT)
Dept: ENDOCRINOLOGY | Facility: CLINIC | Age: 39
End: 2024-09-10
Payer: COMMERCIAL

## 2024-09-10 VITALS
SYSTOLIC BLOOD PRESSURE: 115 MMHG | HEIGHT: 64 IN | BODY MASS INDEX: 28 KG/M2 | WEIGHT: 164 LBS | DIASTOLIC BLOOD PRESSURE: 62 MMHG | HEART RATE: 70 BPM | OXYGEN SATURATION: 97 %

## 2024-09-10 VITALS — BODY MASS INDEX: 28 KG/M2 | HEIGHT: 64 IN | WEIGHT: 164 LBS

## 2024-09-10 DIAGNOSIS — E66.9 OBESITY, UNSPECIFIED: ICD-10-CM

## 2024-09-10 DIAGNOSIS — R73.03 PREDIABETES.: ICD-10-CM

## 2024-09-10 PROCEDURE — 99214 OFFICE O/P EST MOD 30 MIN: CPT

## 2024-09-10 NOTE — HISTORY OF PRESENT ILLNESS
[FreeTextEntry1] : Ms. Moore is presenting for follow up of weight gain.   39 year old female with PMH of prediabetes, BMI 35 presenting for weight management.  Pt lost her bother.  #BMI 35 --> 31 --> 28   Pt had weight gain of 50 pounds within 1 year. Pt occasional power walks but has recently stopped. Pt is a pescatarian (eats fish). Skips breakfast, eats grains like quinoa and rice with salads or rice, crackers. No soda or juice. Denies purple stretch marks. Notes she has some darkening of the right shin. She is fatigued frequently. Has children. Periods have been occurring every month except for the last 2 months where she had spotting in between periods (felt it was 2 separate periods each month). Denies constipation, temperature changes, hair loss, difficulty swallowing.   Interval hx:  Pt has healthy diet, walks a few times per week for 45 minutes.  Now weighs 163 <-- 172lbs <-- 181lbs from 197 from 215.  Tolerating Ozempic 2.0mg weekly (started 2 weeks ago). Mild constipation.   A1c 5.4% Dec 2023  Pt moved to [facility].  Labs done today.

## 2024-09-10 NOTE — ASSESSMENT
[Carbohydrate Consistent Diet] : carbohydrate consistent diet [Importance of Diet and Exercise] : importance of diet and exercise to improve glycemic control, achieve weight loss and improve cardiovascular health [Exercise/Effect on Glucose] : exercise/effect on glucose [Weight Loss] : weight loss [FreeTextEntry1] : 38 year old female with no significant PMH presenting for follow up of weight gain of 50 pounds within 1 year.  1. BMI 35 --> 37 --> 34 --> 31 -The patient was encouraged to continue to decrease their caloric intake and increase their exercise. Discussed portion control and good meal choices. Proper exercise (not only cardio) but exercising with weights and raising heart rate to 80% (safely) for a total of 150mins/week or 75 mins/week of vigorous exercise is recommended. Also would try and cut 500 calories daily from his diet. Need to cut 3,500 calories (from diet and exercise) to burn 1 pound of fat. -Continue Ozempic 2.0mg weekly. No h/o MTC, MEN2 or pancreatitis.   2. Pre diabetes A1c 5.7% --> 5.4% Continue ozempic, diet and exercise.  Patient verbalized understanding of the above. All questions were answered to patient's satisfaction. Dispo: Patient will follow up in 4 months.

## 2024-09-10 NOTE — HISTORY OF PRESENT ILLNESS
[FreeTextEntry1] : Ms. Moore is presenting for follow up of weight gain.   39 year old female with PMH of prediabetes, BMI 35 presenting for weight management.  Pt lost her bother.  #BMI 35 --> 31 --> 28   Pt had weight gain of 50 pounds within 1 year. Pt occasional power walks but has recently stopped. Pt is a pescatarian (eats fish). Skips breakfast, eats grains like quinoa and rice with salads or rice, crackers. No soda or juice. Denies purple stretch marks. Notes she has some darkening of the right shin. She is fatigued frequently. Has children. Periods have been occurring every month except for the last 2 months where she had spotting in between periods (felt it was 2 separate periods each month). Denies constipation, temperature changes, hair loss, difficulty swallowing.   Interval hx:  Pt has healthy diet, walks a few times per week for 45 minutes.  Now weighs 163 <-- 172lbs <-- 181lbs from 197 from 215.  Tolerating Ozempic 2.0mg weekly (started 2 weeks ago). Mild constipation.   A1c 5.4% Dec 2023  Pt moved to [facility].  Labs done today.

## 2024-09-11 LAB
ANION GAP SERPL CALC-SCNC: 12 MMOL/L
BUN SERPL-MCNC: 10 MG/DL
CALCIUM SERPL-MCNC: 9.8 MG/DL
CHLORIDE SERPL-SCNC: 107 MMOL/L
CO2 SERPL-SCNC: 22 MMOL/L
CREAT SERPL-MCNC: 0.62 MG/DL
EGFR: 117 ML/MIN/1.73M2
ESTIMATED AVERAGE GLUCOSE: 111 MG/DL
GLUCOSE SERPL-MCNC: 87 MG/DL
HBA1C MFR BLD HPLC: 5.5 %
POTASSIUM SERPL-SCNC: 5.1 MMOL/L
SODIUM SERPL-SCNC: 141 MMOL/L
TSH SERPL-ACNC: 1.31 UIU/ML

## 2025-01-20 ENCOUNTER — APPOINTMENT (OUTPATIENT)
Dept: ENDOCRINOLOGY | Facility: CLINIC | Age: 40
End: 2025-01-20
Payer: COMMERCIAL

## 2025-01-20 VITALS
WEIGHT: 157 LBS | DIASTOLIC BLOOD PRESSURE: 66 MMHG | SYSTOLIC BLOOD PRESSURE: 98 MMHG | HEIGHT: 64 IN | OXYGEN SATURATION: 100 % | HEART RATE: 67 BPM | BODY MASS INDEX: 26.8 KG/M2

## 2025-01-20 DIAGNOSIS — R73.03 PREDIABETES.: ICD-10-CM

## 2025-01-20 DIAGNOSIS — E66.9 OBESITY, UNSPECIFIED: ICD-10-CM

## 2025-01-20 PROCEDURE — 99214 OFFICE O/P EST MOD 30 MIN: CPT

## 2025-02-05 ENCOUNTER — TRANSCRIPTION ENCOUNTER (OUTPATIENT)
Age: 40
End: 2025-02-05

## 2025-06-16 ENCOUNTER — APPOINTMENT (OUTPATIENT)
Dept: ENDOCRINOLOGY | Facility: CLINIC | Age: 40
End: 2025-06-16